# Patient Record
Sex: FEMALE | Race: WHITE
[De-identification: names, ages, dates, MRNs, and addresses within clinical notes are randomized per-mention and may not be internally consistent; named-entity substitution may affect disease eponyms.]

---

## 2017-09-12 ENCOUNTER — HOSPITAL ENCOUNTER (EMERGENCY)
Dept: HOSPITAL 47 - EC | Age: 72
Discharge: HOME | End: 2017-09-12
Payer: MEDICARE

## 2017-09-12 VITALS
RESPIRATION RATE: 18 BRPM | HEART RATE: 87 BPM | SYSTOLIC BLOOD PRESSURE: 175 MMHG | DIASTOLIC BLOOD PRESSURE: 58 MMHG | TEMPERATURE: 98.8 F

## 2017-09-12 DIAGNOSIS — R00.1: ICD-10-CM

## 2017-09-12 DIAGNOSIS — Z88.1: ICD-10-CM

## 2017-09-12 DIAGNOSIS — N39.0: Primary | ICD-10-CM

## 2017-09-12 DIAGNOSIS — Z79.82: ICD-10-CM

## 2017-09-12 DIAGNOSIS — R42: ICD-10-CM

## 2017-09-12 DIAGNOSIS — Z79.899: ICD-10-CM

## 2017-09-12 LAB
ALP SERPL-CCNC: 61 U/L (ref 38–126)
ALT SERPL-CCNC: 37 U/L (ref 9–52)
ANION GAP SERPL CALC-SCNC: 12 MMOL/L
APTT BLD: 26.9 SEC (ref 22–30)
AST SERPL-CCNC: 25 U/L (ref 14–36)
BASOPHILS # BLD AUTO: 0.1 K/UL (ref 0–0.2)
BASOPHILS NFR BLD AUTO: 1 %
BUN SERPL-SCNC: 19 MG/DL (ref 7–17)
CALCIUM SPEC-MCNC: 10 MG/DL (ref 8.4–10.2)
CH: 30.6
CHCM: 34.3
CHLORIDE SERPL-SCNC: 104 MMOL/L (ref 98–107)
CK SERPL-CCNC: 69 U/L (ref 30–135)
CO2 SERPL-SCNC: 24 MMOL/L (ref 22–30)
EOSINOPHIL # BLD AUTO: 0.3 K/UL (ref 0–0.7)
EOSINOPHIL NFR BLD AUTO: 3 %
ERYTHROCYTE [DISTWIDTH] IN BLOOD BY AUTOMATED COUNT: 4.7 M/UL (ref 3.8–5.4)
ERYTHROCYTE [DISTWIDTH] IN BLOOD: 13.6 % (ref 11.5–15.5)
GLUCOSE SERPL-MCNC: 76 MG/DL (ref 74–99)
HCT VFR BLD AUTO: 42.1 % (ref 34–46)
HDW: 2.48
HGB BLD-MCNC: 14.4 GM/DL (ref 11.4–16)
INR PPP: 1 (ref ?–1.2)
LUC NFR BLD AUTO: 2 %
LYMPHOCYTES # SPEC AUTO: 2.4 K/UL (ref 1–4.8)
LYMPHOCYTES NFR SPEC AUTO: 28 %
MCH RBC QN AUTO: 30.7 PG (ref 25–35)
MCHC RBC AUTO-ENTMCNC: 34.3 G/DL (ref 31–37)
MCV RBC AUTO: 89.6 FL (ref 80–100)
MONOCYTES # BLD AUTO: 0.6 K/UL (ref 0–1)
MONOCYTES NFR BLD AUTO: 7 %
NEUTROPHILS # BLD AUTO: 5 K/UL (ref 1.3–7.7)
NEUTROPHILS NFR BLD AUTO: 59 %
NON-AFRICAN AMERICAN GFR(MDRD): 45
PARTICLE COUNT: (no result)
PH UR: 6 [PH] (ref 5–8)
POTASSIUM SERPL-SCNC: 4.5 MMOL/L (ref 3.5–5.1)
PROT SERPL-MCNC: 8.2 G/DL (ref 6.3–8.2)
PT BLD: 10.4 SEC (ref 9–12)
RBC UR QL: 2 /HPF (ref 0–5)
SODIUM SERPL-SCNC: 140 MMOL/L (ref 137–145)
SP GR UR: 1 (ref 1–1.03)
SQUAMOUS UR QL AUTO: 2 /HPF (ref 0–4)
TROPONIN I SERPL-MCNC: <0.012 NG/ML (ref 0–0.03)
UA BILLING (MACRO VS. MICRO): (no result)
UROBILINOGEN UR QL STRIP: <2 MG/DL (ref ?–2)
WBC # BLD AUTO: 0.17 10*3/UL
WBC # BLD AUTO: 8.5 K/UL (ref 3.8–10.6)
WBC #/AREA URNS HPF: 10 /HPF (ref 0–5)
WBC (PEROX): 8.52

## 2017-09-12 PROCEDURE — 71020: CPT

## 2017-09-12 PROCEDURE — 93005 ELECTROCARDIOGRAM TRACING: CPT

## 2017-09-12 PROCEDURE — 84484 ASSAY OF TROPONIN QUANT: CPT

## 2017-09-12 PROCEDURE — 85025 COMPLETE CBC W/AUTO DIFF WBC: CPT

## 2017-09-12 PROCEDURE — 85730 THROMBOPLASTIN TIME PARTIAL: CPT

## 2017-09-12 PROCEDURE — 82553 CREATINE MB FRACTION: CPT

## 2017-09-12 PROCEDURE — 80053 COMPREHEN METABOLIC PANEL: CPT

## 2017-09-12 PROCEDURE — 81001 URINALYSIS AUTO W/SCOPE: CPT

## 2017-09-12 PROCEDURE — 82550 ASSAY OF CK (CPK): CPT

## 2017-09-12 PROCEDURE — 85610 PROTHROMBIN TIME: CPT

## 2017-09-12 PROCEDURE — 96360 HYDRATION IV INFUSION INIT: CPT

## 2017-09-12 PROCEDURE — 99284 EMERGENCY DEPT VISIT MOD MDM: CPT

## 2017-09-12 PROCEDURE — 36415 COLL VENOUS BLD VENIPUNCTURE: CPT

## 2017-09-12 NOTE — XR
EXAMINATION TYPE: XR chest 2V

 

DATE OF EXAM: 9/12/2017

 

COMPARISON: NONE

 

TECHNIQUE: PA and lateral views submitted.

 

HISTORY: Lightheaded

 

FINDINGS:

The lungs are clear and  there is no pneumothorax, pleural effusion, or focal pneumonia.  Hypertrophi
c and degenerative change of the spine. Mild hyperinflation correlate for COPD. Arthropathy of the 
oulders.

 

IMPRESSION: 

1. No acute process.

## 2017-09-12 NOTE — ED
General Adult HPI





- General


Stated complaint: light headed/pvc


Time Seen by Provider: 09/12/17 15:40


Source: RN notes reviewed





- History of Present Illness


Initial comments: 





Patient 72-year-old female who presents emergency room today with chief 

complaint of lightheaded and dizziness on and off over the last 2 months.  

Patient does admit that she was started on blood pressure medication from the 

family doctor in this time.  Patient states does not seem to have made any 

improvement.  She states she was on blood pressure medication once in the past 

but after losing which was able to to stop this medication.  Patient describes 

it as lightheadedness.  She does admit that it's worse with certain movements 

of her head at times.  She states it lasts for a few minutes.  She gives 

examples that when she goes from sitting to a standing position when she has 

these feelings.  She states that he here in the emergency room at this time no 

lightheadedness or dizziness.  She denies any other complaints or associated 

symptoms. Patient denies any recent fever, chills, shortness of breath, chest 

pain, back pain, abdominal pain, nausea or vomiting, numbness or tingling, 

dysuria or hematuria, constipation or diarrhea, headaches or visual changes, or 

any other complaints.





- Related Data


 Home Medications











 Medication  Instructions  Recorded  Confirmed


 


Aspirin EC [Ecotrin Low Dose] 81 mg PO DAILY 09/12/17 09/12/17


 


Cranberry Fruit Extract [Cranberry] 1,000 mg PO DAILY 09/12/17 09/12/17


 


Lisinopril-Hctz 20-12.5 mg 0.5 tab PO DAILY 09/12/17 09/12/17





[Zestoretic 20-12.5]   


 


Multivitamins, Thera [Multivitamin 1 tab PO DAILY 09/12/17 09/12/17





(formulary)]   








 Previous Rx's











 Medication  Instructions  Recorded


 


Sulfamethox-Tmp 800-160Mg [Bactrim 1 tab PO Q12HR #14 tab 09/12/17





-160 mg]  











 Allergies











Allergy/AdvReac Type Severity Reaction Status Date / Time


 


nitrofurantoin AdvReac  FLU LIKE Verified 09/12/17 15:39





[From Macrobid]   SYMPTOMS  














Review of Systems


ROS Statement: 


Those systems with pertinent positive or pertinent negative responses have been 

documented in the HPI.





ROS Other: All systems not noted in ROS Statement are negative.





General Exam





- General Exam Comments


Initial Comments: 





General:  The patient is awake and alert, in no distress, and does not appear 

acutely ill. 


Eye:  Pupils are equal, round and reactive to light, extra-ocular movements are 

intact.  No nystagmus.  There is normal conjunctiva bilaterally.  No signs of 

icterus.  


Ears, nose, mouth and throat:  There are moist mucous membranes and no oral 

lesions. 


Neck:  The neck is supple, there is no tenderness or JVD.  


Cardiovascular:  There is a regular rate and rhythm. No murmur, rub or gallop 

is appreciated.


Respiratory:  Lungs are clear to auscultation, respirations are non-labored, 

breath sounds are equal.  No wheezes, stridor, rales, or rhonchi.


Musculoskeletal:  Normal ROM, no tenderness.  Strength 5/5. Sensation intact. 

Pulses equal bilaterally 2+.  


Neurological:  A&O x 3. CN II-XII intact, There are no obvious motor or sensory 

deficits. Coordination appears grossly intact. Speech is normal.


Skin:  Skin is warm and dry and no rashes or lesions are noted. 


Psychiatric:  Cooperative, appropriate mood & affect, normal judgment.  





EKG Findings





- EKG Comments:


EKG Findings:: EKG shows bradycardia at 48 beats per minute.  ND interval 172.  

.  QT/QTc 444/396.  No ST change.





Medical Decision Making





- Medical Decision Making





Patient reexamined at this time shows no signs of distress.  Patient's resting 

comfortably currently sitting at bedside.  She does admit to feeling better 

after a liter bolus here the emergency room.  Patient's labs been reviewed 

shows evidence for urinary tract infection.  Patient EKG shows sinus 

bradycardia.  Was discussed about being admitted due to having symptoms.  At 

this time she states she is asymptomatic does not want be admitted to the 

hospital would rather go home and follow-up outpatient.  She will be given 

information for cardiology follow-up.  She is advised that she should return to 

emergency room if symptoms return or for any other concerns.  This was 

discussed with attending physician 





- Lab Data


Result diagrams: 


 09/12/17 15:33





 09/12/17 15:33


 Lab Results











  09/12/17 09/12/17 09/12/17 Range/Units





  15:33 15:33 15:33 


 


WBC   8.5   (3.8-10.6)  k/uL


 


RBC   4.70   (3.80-5.40)  m/uL


 


Hgb   14.4   (11.4-16.0)  gm/dL


 


Hct   42.1   (34.0-46.0)  %


 


MCV   89.6   (80.0-100.0)  fL


 


MCH   30.7   (25.0-35.0)  pg


 


MCHC   34.3   (31.0-37.0)  g/dL


 


RDW   13.6   (11.5-15.5)  %


 


Plt Count   226   (150-450)  k/uL


 


Neutrophils %   59   %


 


Lymphocytes %   28   %


 


Monocytes %   7   %


 


Eosinophils %   3   %


 


Basophils %   1   %


 


Neutrophils #   5.0   (1.3-7.7)  k/uL


 


Lymphocytes #   2.4   (1.0-4.8)  k/uL


 


Monocytes #   0.6   (0-1.0)  k/uL


 


Eosinophils #   0.3   (0-0.7)  k/uL


 


Basophils #   0.1   (0-0.2)  k/uL


 


PT     (9.0-12.0)  sec


 


INR     (<1.2)  


 


APTT     (22.0-30.0)  sec


 


Sodium    140  (137-145)  mmol/L


 


Potassium    4.5  (3.5-5.1)  mmol/L


 


Chloride    104  ()  mmol/L


 


Carbon Dioxide    24  (22-30)  mmol/L


 


Anion Gap    12  mmol/L


 


BUN    19 H  (7-17)  mg/dL


 


Creatinine    1.18 H  (0.52-1.04)  mg/dL


 


Est GFR (MDRD) Af Amer    55  (>60 ml/min/1.73 sqM)  


 


Est GFR (MDRD) Non-Af    45  (>60 ml/min/1.73 sqM)  


 


Glucose    76  (74-99)  mg/dL


 


Calcium    10.0  (8.4-10.2)  mg/dL


 


Total Bilirubin    0.7  (0.2-1.3)  mg/dL


 


AST    25  (14-36)  U/L


 


ALT    37  (9-52)  U/L


 


Alkaline Phosphatase    61  ()  U/L


 


Total Creatine Kinase  69    ()  U/L


 


CK-MB (CK-2)  0.8    (0.0-2.4)  ng/mL


 


CK-MB (CK-2) Rel Index  1.2    


 


Troponin I  <0.012    (0.000-0.034)  ng/mL


 


Total Protein    8.2  (6.3-8.2)  g/dL


 


Albumin    4.9  (3.5-5.0)  g/dL


 


Urine Color     


 


Urine Appearance     (Clear)  


 


Urine pH     (5.0-8.0)  


 


Ur Specific Gravity     (1.001-1.035)  


 


Urine Protein     (Negative)  


 


Urine Glucose (UA)     (Negative)  


 


Urine Ketones     (Negative)  


 


Urine Blood     (Negative)  


 


Urine Nitrite     (Negative)  


 


Urine Bilirubin     (Negative)  


 


Urine Urobilinogen     (<2.0)  mg/dL


 


Ur Leukocyte Esterase     (Negative)  


 


Urine RBC     (0-5)  /hpf


 


Urine WBC     (0-5)  /hpf


 


Ur Squamous Epith Cells     (0-4)  /hpf


 


Urine Bacteria     (None)  /hpf














  09/12/17 09/12/17 Range/Units





  15:33 17:01 


 


WBC    (3.8-10.6)  k/uL


 


RBC    (3.80-5.40)  m/uL


 


Hgb    (11.4-16.0)  gm/dL


 


Hct    (34.0-46.0)  %


 


MCV    (80.0-100.0)  fL


 


MCH    (25.0-35.0)  pg


 


MCHC    (31.0-37.0)  g/dL


 


RDW    (11.5-15.5)  %


 


Plt Count    (150-450)  k/uL


 


Neutrophils %    %


 


Lymphocytes %    %


 


Monocytes %    %


 


Eosinophils %    %


 


Basophils %    %


 


Neutrophils #    (1.3-7.7)  k/uL


 


Lymphocytes #    (1.0-4.8)  k/uL


 


Monocytes #    (0-1.0)  k/uL


 


Eosinophils #    (0-0.7)  k/uL


 


Basophils #    (0-0.2)  k/uL


 


PT  10.4   (9.0-12.0)  sec


 


INR  1.0   (<1.2)  


 


APTT  26.9   (22.0-30.0)  sec


 


Sodium    (137-145)  mmol/L


 


Potassium    (3.5-5.1)  mmol/L


 


Chloride    ()  mmol/L


 


Carbon Dioxide    (22-30)  mmol/L


 


Anion Gap    mmol/L


 


BUN    (7-17)  mg/dL


 


Creatinine    (0.52-1.04)  mg/dL


 


Est GFR (MDRD) Af Amer    (>60 ml/min/1.73 sqM)  


 


Est GFR (MDRD) Non-Af    (>60 ml/min/1.73 sqM)  


 


Glucose    (74-99)  mg/dL


 


Calcium    (8.4-10.2)  mg/dL


 


Total Bilirubin    (0.2-1.3)  mg/dL


 


AST    (14-36)  U/L


 


ALT    (9-52)  U/L


 


Alkaline Phosphatase    ()  U/L


 


Total Creatine Kinase    ()  U/L


 


CK-MB (CK-2)    (0.0-2.4)  ng/mL


 


CK-MB (CK-2) Rel Index    


 


Troponin I    (0.000-0.034)  ng/mL


 


Total Protein    (6.3-8.2)  g/dL


 


Albumin    (3.5-5.0)  g/dL


 


Urine Color   Light Yellow  


 


Urine Appearance   Clear  (Clear)  


 


Urine pH   6.0  (5.0-8.0)  


 


Ur Specific Gravity   1.005  (1.001-1.035)  


 


Urine Protein   Negative  (Negative)  


 


Urine Glucose (UA)   Negative  (Negative)  


 


Urine Ketones   Negative  (Negative)  


 


Urine Blood   Small H  (Negative)  


 


Urine Nitrite   Positive H  (Negative)  


 


Urine Bilirubin   Negative  (Negative)  


 


Urine Urobilinogen   <2.0  (<2.0)  mg/dL


 


Ur Leukocyte Esterase   Moderate H  (Negative)  


 


Urine RBC   2  (0-5)  /hpf


 


Urine WBC   10 H  (0-5)  /hpf


 


Ur Squamous Epith Cells   2  (0-4)  /hpf


 


Urine Bacteria   Moderate H  (None)  /hpf














Disposition


Clinical Impression: 


 Bradycardia, UTI (urinary tract infection), Lightheaded





Disposition: HOME SELF-CARE


Condition: Stable


Instructions:  Bradycardia (ED)


Additional Instructions: 


Please use medication as discussed.  Please follow-up with family doctor in the 

next 2 days of symptoms have not improved.  Please return to emergency room if 

the symptoms increase or worsen or for any other concerns.


Prescriptions: 


Sulfamethox-Tmp 800-160Mg [Bactrim -160 mg] 1 tab PO Q12HR #14 tab


Referrals: 


Jaime Gandhi MD [Primary Care Provider] - 1-2 days


Ephraim Ramesh MD [STAFF PHYSICIAN] - 1-2 days


Time of Disposition: 17:58

## 2017-09-17 ENCOUNTER — HOSPITAL ENCOUNTER (INPATIENT)
Dept: HOSPITAL 47 - EC | Age: 72
LOS: 3 days | Discharge: HOME | DRG: 243 | End: 2017-09-20
Payer: MEDICARE

## 2017-09-17 VITALS — BODY MASS INDEX: 33.5 KG/M2

## 2017-09-17 DIAGNOSIS — I45.10: ICD-10-CM

## 2017-09-17 DIAGNOSIS — T40.2X5A: ICD-10-CM

## 2017-09-17 DIAGNOSIS — Z79.82: ICD-10-CM

## 2017-09-17 DIAGNOSIS — N17.9: ICD-10-CM

## 2017-09-17 DIAGNOSIS — Z82.49: ICD-10-CM

## 2017-09-17 DIAGNOSIS — Z98.49: ICD-10-CM

## 2017-09-17 DIAGNOSIS — Z79.899: ICD-10-CM

## 2017-09-17 DIAGNOSIS — Z87.440: ICD-10-CM

## 2017-09-17 DIAGNOSIS — R53.83: ICD-10-CM

## 2017-09-17 DIAGNOSIS — T46.4X5A: ICD-10-CM

## 2017-09-17 DIAGNOSIS — R06.02: ICD-10-CM

## 2017-09-17 DIAGNOSIS — I44.2: Primary | ICD-10-CM

## 2017-09-17 DIAGNOSIS — I10: ICD-10-CM

## 2017-09-17 DIAGNOSIS — Z80.1: ICD-10-CM

## 2017-09-17 DIAGNOSIS — T37.0X5A: ICD-10-CM

## 2017-09-17 DIAGNOSIS — E87.5: ICD-10-CM

## 2017-09-17 DIAGNOSIS — Z80.3: ICD-10-CM

## 2017-09-17 LAB
ALP SERPL-CCNC: 66 U/L (ref 38–126)
ALT SERPL-CCNC: 36 U/L (ref 9–52)
ANION GAP SERPL CALC-SCNC: 11 MMOL/L
AST SERPL-CCNC: 28 U/L (ref 14–36)
BASOPHILS # BLD AUTO: 0.1 K/UL (ref 0–0.2)
BASOPHILS NFR BLD AUTO: 1 %
BUN SERPL-SCNC: 23 MG/DL (ref 7–17)
CALCIUM SPEC-MCNC: 9.9 MG/DL (ref 8.4–10.2)
CH: 31.5
CHCM: 34.8
CHLORIDE SERPL-SCNC: 104 MMOL/L (ref 98–107)
CO2 SERPL-SCNC: 22 MMOL/L (ref 22–30)
EOSINOPHIL # BLD AUTO: 0.2 K/UL (ref 0–0.7)
EOSINOPHIL NFR BLD AUTO: 3 %
ERYTHROCYTE [DISTWIDTH] IN BLOOD BY AUTOMATED COUNT: 4.57 M/UL (ref 3.8–5.4)
ERYTHROCYTE [DISTWIDTH] IN BLOOD: 15.1 % (ref 11.5–15.5)
GLUCOSE SERPL-MCNC: 83 MG/DL (ref 74–99)
HCT VFR BLD AUTO: 41.6 % (ref 34–46)
HDW: 2.37
HGB BLD-MCNC: 14.1 GM/DL (ref 11.4–16)
LUC NFR BLD AUTO: 3 %
LYMPHOCYTES # SPEC AUTO: 2.3 K/UL (ref 1–4.8)
LYMPHOCYTES NFR SPEC AUTO: 33 %
MAGNESIUM SPEC-SCNC: 2.2 MG/DL (ref 1.6–2.3)
MCH RBC QN AUTO: 30.9 PG (ref 25–35)
MCHC RBC AUTO-ENTMCNC: 33.9 G/DL (ref 31–37)
MCV RBC AUTO: 91.1 FL (ref 80–100)
MONOCYTES # BLD AUTO: 0.6 K/UL (ref 0–1)
MONOCYTES NFR BLD AUTO: 8 %
NEUTROPHILS # BLD AUTO: 3.5 K/UL (ref 1.3–7.7)
NEUTROPHILS NFR BLD AUTO: 52 %
NON-AFRICAN AMERICAN GFR(MDRD): 40
POTASSIUM SERPL-SCNC: 5.3 MMOL/L (ref 3.5–5.1)
PROT SERPL-MCNC: 7.6 G/DL (ref 6.3–8.2)
SODIUM SERPL-SCNC: 137 MMOL/L (ref 137–145)
WBC # BLD AUTO: 0.19 10*3/UL
WBC # BLD AUTO: 6.8 K/UL (ref 3.8–10.6)
WBC (PEROX): 6.73

## 2017-09-17 PROCEDURE — 80053 COMPREHEN METABOLIC PANEL: CPT

## 2017-09-17 PROCEDURE — 36415 COLL VENOUS BLD VENIPUNCTURE: CPT

## 2017-09-17 PROCEDURE — 93306 TTE W/DOPPLER COMPLETE: CPT

## 2017-09-17 PROCEDURE — 84484 ASSAY OF TROPONIN QUANT: CPT

## 2017-09-17 PROCEDURE — 83735 ASSAY OF MAGNESIUM: CPT

## 2017-09-17 PROCEDURE — 99285 EMERGENCY DEPT VISIT HI MDM: CPT

## 2017-09-17 PROCEDURE — 85025 COMPLETE CBC W/AUTO DIFF WBC: CPT

## 2017-09-17 PROCEDURE — 93005 ELECTROCARDIOGRAM TRACING: CPT

## 2017-09-17 PROCEDURE — 80048 BASIC METABOLIC PNL TOTAL CA: CPT

## 2017-09-17 PROCEDURE — 33210 INSERT ELECTRD/PM CATH SNGL: CPT

## 2017-09-17 PROCEDURE — 71020: CPT

## 2017-09-17 PROCEDURE — 82553 CREATINE MB FRACTION: CPT

## 2017-09-17 PROCEDURE — 84443 ASSAY THYROID STIM HORMONE: CPT

## 2017-09-17 PROCEDURE — 33208 INSRT HEART PM ATRIAL & VENT: CPT

## 2017-09-17 RX ADMIN — LISINOPRIL SCH MG: 10 TABLET ORAL at 20:38

## 2017-09-17 RX ADMIN — CEFAZOLIN SCH MLS/HR: 330 INJECTION, POWDER, FOR SOLUTION INTRAMUSCULAR; INTRAVENOUS at 20:39

## 2017-09-17 RX ADMIN — CEFAZOLIN SCH MLS/HR: 330 INJECTION, POWDER, FOR SOLUTION INTRAMUSCULAR; INTRAVENOUS at 17:22

## 2017-09-17 NOTE — ED
Arrhythmia/Palpitations HPI





- General


Chief Complaint: Arrhythmia/Palpitations


Stated Complaint: heart palpitations/SOB


Time Seen by Provider: 09/17/17 15:38


Source: patient


Mode of arrival: ambulatory


Limitations: no limitations





- History of Present Illness


Initial Comments: 





This is a 72-year-old female with a history of hypertension who presents 

emergency department for palpitations.  She states that the symptoms seemed to 

start about 5 or 6 days ago.  She states that at that time she was started on 

Bactrim for a UTI.  She did come to the emergency department for 

lightheadedness and dizziness with palpitations at that time.  She was told 

that she had bradycardia and was sent home.  She states that she has been 

having persistent symptoms since then.  She has kept track of her heart rate 

since then and it is been ranging from the 30s to 40s.  At times it will go 

into the 70s or 80s.  She denies any chest pain or shortness of breath.  No 

lightheadedness currently.  She states that she feels that the symptoms are 

related to her lisinopril or Bactrim.





- Related Data


 Home Medications











 Medication  Instructions  Recorded  Confirmed


 


Aspirin EC [Ecotrin Low Dose] 81 mg PO DAILY 09/12/17 09/17/17


 


Cranberry Fruit Extract [Cranberry] 1,000 mg PO DAILY 09/12/17 09/17/17


 


Lisinopril-Hctz 20-12.5 mg 0.5 tab PO DAILY 09/12/17 09/17/17





[Zestoretic 20-12.5]   


 


Multivitamins, Thera [Multivitamin 1 tab PO DAILY 09/12/17 09/17/17





(formulary)]   








 Previous Rx's











 Medication  Instructions  Recorded


 


Sulfamethox-Tmp 800-160Mg [Bactrim 1 tab PO Q12HR #14 tab 09/12/17





-160 mg]  











 Allergies











Allergy/AdvReac Type Severity Reaction Status Date / Time


 


nitrofurantoin AdvReac  FLU LIKE Verified 09/17/17 15:38





[From Macrobid]   SYMPTOMS  














Review of Systems


ROS Statement: 


Those systems with pertinent positive or pertinent negative responses have been 

documented in the HPI.





ROS Other: All systems not noted in ROS Statement are negative.





Past Medical History


Past Medical History: Hypertension


History of Any Multi-Drug Resistant Organisms: None Reported


Additional Past Surgical History / Comment(s): D&C


Past Psychological History: No Psychological Hx Reported


Smoking Status: Never smoker


Past Alcohol Use History: None Reported


Past Drug Use History: None Reported





General Exam





- General Exam Comments


Initial Comments: 





Constitutional: Awake alert Appears comfortable


Head: Normocephalic atraumatic 


Eyes: no conjunctival injection No scleral icterus EOMI


Neck: No JVD Supple


Heart: Bradycardia with regular rhythm normal S1-S2 no murmurs


Lungs: Clear to auscultation bilaterally No wheezing No rales


Abdomen: Soft nondistended nontender


Extremities: Non edematous DP pulses intact Radial pulses intact


Neuro: A&Ox3 No focal neurologic deficits


Psych: Appropriate mood and affect





Limitations: no limitations





Course


 Vital Signs











  09/17/17 09/17/17 09/17/17





  14:47 15:50 17:00


 


Temperature 98.3 F  


 


Pulse Rate 43 L 52 L 41 L


 


Respiratory 20 20 18





Rate   


 


Blood Pressure 204/89 179/79 165/74


 


O2 Sat by Pulse 98 95 96





Oximetry   














EKG Findings





- EKG Comments:


EKG Findings:: EKG showing sinus rhythm with what appears to be a type II AV 

block Mobitz 2.  The rate is 44.  She has a right bundle-branch block.  QTC is 

384.  Other intervals are also remarkable for .  No ectopy.





Medical Decision Making





- Medical Decision Making





This is a 72-year-old female presents emergency department for palpitations.  

She was found to be any Mobitz 2 AV block.  She had mild acute kidney injury 

and hyperkalemia likely secondary to the combination of her blood pressure 

medication and Bactrim.  I'm going to fluid hydrate her.  She needs to be 

monitored overnight and seen by cardiology for possible pacemaker placement.  

The patient was updated and agree.  Dr. Hale except see admission.  All 

questions were answered.





- Lab Data


Result diagrams: 


 09/17/17 15:21





 09/17/17 15:21


 Lab Results











  09/17/17 09/17/17 09/17/17 Range/Units





  15:21 15:21 15:21 


 


WBC   6.8   (3.8-10.6)  k/uL


 


RBC   4.57   (3.80-5.40)  m/uL


 


Hgb   14.1   (11.4-16.0)  gm/dL


 


Hct   41.6   (34.0-46.0)  %


 


MCV   91.1   (80.0-100.0)  fL


 


MCH   30.9   (25.0-35.0)  pg


 


MCHC   33.9   (31.0-37.0)  g/dL


 


RDW   15.1   (11.5-15.5)  %


 


Plt Count   175   (150-450)  k/uL


 


Neutrophils %   52   %


 


Lymphocytes %   33   %


 


Monocytes %   8   %


 


Eosinophils %   3   %


 


Basophils %   1   %


 


Neutrophils #   3.5   (1.3-7.7)  k/uL


 


Lymphocytes #   2.3   (1.0-4.8)  k/uL


 


Monocytes #   0.6   (0-1.0)  k/uL


 


Eosinophils #   0.2   (0-0.7)  k/uL


 


Basophils #   0.1   (0-0.2)  k/uL


 


Sodium    137  (137-145)  mmol/L


 


Potassium    5.3 H  (3.5-5.1)  mmol/L


 


Chloride    104  ()  mmol/L


 


Carbon Dioxide    22  (22-30)  mmol/L


 


Anion Gap    11  mmol/L


 


BUN    23 H  (7-17)  mg/dL


 


Creatinine    1.30 H  (0.52-1.04)  mg/dL


 


Est GFR (MDRD) Af Amer    49  (>60 ml/min/1.73 sqM)  


 


Est GFR (MDRD) Non-Af    40  (>60 ml/min/1.73 sqM)  


 


Glucose    83  (74-99)  mg/dL


 


Calcium    9.9  (8.4-10.2)  mg/dL


 


Magnesium    2.2  (1.6-2.3)  mg/dL


 


Total Bilirubin    0.7  (0.2-1.3)  mg/dL


 


AST    28  (14-36)  U/L


 


ALT    36  (9-52)  U/L


 


Alkaline Phosphatase    66  ()  U/L


 


CK-MB (CK-2)  1.3    (0.0-2.4)  ng/mL


 


Troponin I     (0.000-0.034)  ng/mL


 


Total Protein    7.6  (6.3-8.2)  g/dL


 


Albumin    4.5  (3.5-5.0)  g/dL














  09/17/17 Range/Units





  15:21 


 


WBC   (3.8-10.6)  k/uL


 


RBC   (3.80-5.40)  m/uL


 


Hgb   (11.4-16.0)  gm/dL


 


Hct   (34.0-46.0)  %


 


MCV   (80.0-100.0)  fL


 


MCH   (25.0-35.0)  pg


 


MCHC   (31.0-37.0)  g/dL


 


RDW   (11.5-15.5)  %


 


Plt Count   (150-450)  k/uL


 


Neutrophils %   %


 


Lymphocytes %   %


 


Monocytes %   %


 


Eosinophils %   %


 


Basophils %   %


 


Neutrophils #   (1.3-7.7)  k/uL


 


Lymphocytes #   (1.0-4.8)  k/uL


 


Monocytes #   (0-1.0)  k/uL


 


Eosinophils #   (0-0.7)  k/uL


 


Basophils #   (0-0.2)  k/uL


 


Sodium   (137-145)  mmol/L


 


Potassium   (3.5-5.1)  mmol/L


 


Chloride   ()  mmol/L


 


Carbon Dioxide   (22-30)  mmol/L


 


Anion Gap   mmol/L


 


BUN   (7-17)  mg/dL


 


Creatinine   (0.52-1.04)  mg/dL


 


Est GFR (MDRD) Af Amer   (>60 ml/min/1.73 sqM)  


 


Est GFR (MDRD) Non-Af   (>60 ml/min/1.73 sqM)  


 


Glucose   (74-99)  mg/dL


 


Calcium   (8.4-10.2)  mg/dL


 


Magnesium   (1.6-2.3)  mg/dL


 


Total Bilirubin   (0.2-1.3)  mg/dL


 


AST   (14-36)  U/L


 


ALT   (9-52)  U/L


 


Alkaline Phosphatase   ()  U/L


 


CK-MB (CK-2)   (0.0-2.4)  ng/mL


 


Troponin I  <0.012  (0.000-0.034)  ng/mL


 


Total Protein   (6.3-8.2)  g/dL


 


Albumin   (3.5-5.0)  g/dL














Disposition


Clinical Impression: 


 Mobitz type 2 second degree AV block, Atrial flutter





Disposition: ADMITTED AS IP TO THIS HOSP


Condition: Serious


Referrals: 


Jaime Gandhi MD [Primary Care Provider] - 1-2 days

## 2017-09-17 NOTE — XR
EXAMINATION TYPE: XR chest 2V

 

DATE OF EXAM: 9/17/2017

 

COMPARISON: September 12 2017.

 

HISTORY: Shortness of breath

 

TECHNIQUE:  Frontal and lateral views of the chest are obtained.

 

FINDINGS:  There is no focal air space opacity, pleural effusion, or pneumothorax seen.  The cardiac 
silhouette size is within normal limits.   The osseous structures are intact.

 

IMPRESSION:  No acute cardiopulmonary process.

## 2017-09-18 RX ADMIN — THERA TABS SCH: TAB at 14:54

## 2017-09-18 RX ADMIN — CEFAZOLIN SCH: 330 INJECTION, POWDER, FOR SOLUTION INTRAMUSCULAR; INTRAVENOUS at 21:13

## 2017-09-18 RX ADMIN — LISINOPRIL SCH: 10 TABLET ORAL at 08:54

## 2017-09-18 RX ADMIN — ASPIRIN 81 MG CHEWABLE TABLET SCH: 81 TABLET CHEWABLE at 08:53

## 2017-09-18 NOTE — P.HPIM
History of Present Illness


72-year-old pleasant female came in with complaints of dizziness and 

lightheadedness was discharged from the ER and came back again with the same 

symptoms and patient was found to have third-degree heart block.  Patient 

denied any orthopnea PND of shadows of breath.  Patient denied any fever chills

, nausea vomiting.  Patient was on Bactrim for urinary tract infection which 

appears to be mostly symptom any bacteria patient does have acute renal 

dysfunction may have chronic kidney disease acute renal dysfunction is 

secondary to lisinopril, hydrochlorothiazide and Bactrim all of which will be 

discontinued at this time.  TSH was ordered by cardiology.  Patient may undergo 

permanent pacemaker placement today.  Patient is hypokalemic secondary to 

Bactrim and lisinopril which is being discontinued at this time.








Review of Systems


REVIEW OF SYSTEMS: 


CONSTITUTIONAL: No fever, no malaise, no fatigue. 


HEENT: No recent visual problems or hearing problems. Denied any sore throat. 


CARDIOVASCULAR: No chest pain, orthopnea, PND, no palpitations, no syncope. 


PULMONARY: No shortness of breath, no cough, no hemoptysis. 


GASTROINTESTINAL: No diarrhea, no nausea, no vomiting, no abdominal pain. 

Normoactive bowel sounds. 


NEUROLOGICAL: No headaches, no weakness, no numbness. 


HEMATOLOGICAL: Denies any bleeding or petechiae. 


GENITOURINARY: Denies any burning micturition, frequency, or urgency. 


MUSCULOSKELETAL/RHEUMATOLOGICAL: Denies any joint pain, swelling, or any muscle 

pain. 


ENDOCRINE: Denies any polyuria or polydipsia. 





The rest of the 14-point review of systems is negative.








Past Medical History


Past Medical History: Hypertension


History of Any Multi-Drug Resistant Organisms: None Reported


Additional Past Surgical History / Comment(s): D&C, cataract surgery


Past Psychological History: No Psychological Hx Reported


Smoking Status: Never smoker


Past Alcohol Use History: None Reported


Past Drug Use History: None Reported





- Past Family History


  ** Brother(s)


History Unknown: Yes


Additional Family Medical History / Comment(s): 3 stents from heart 

catheterization





  ** Mother


History Unknown: Yes


Family Medical History: Cancer


Additional Family Medical History / Comment(s): Breast Cancer





  ** Father


History Unknown: Yes


Family Medical History: Cancer


Additional Family Medical History / Comment(s): Lung Cancer





Medications and Allergies


 Home Medications











 Medication  Instructions  Recorded  Confirmed  Type


 


Aspirin EC [Ecotrin Low Dose] 81 mg PO DAILY 09/12/17 09/17/17 History


 


Cranberry Fruit Extract [Cranberry] 1,000 mg PO DAILY 09/12/17 09/17/17 History


 


Lisinopril-Hctz 20-12.5 mg 0.5 tab PO DAILY 09/12/17 09/17/17 History





[Zestoretic 20-12.5]    


 


Multivitamins, Thera [Multivitamin 1 tab PO DAILY 09/12/17 09/17/17 History





(formulary)]    


 


Sulfamethox-Tmp 800-160Mg [Bactrim 1 tab PO Q12HR #14 tab 09/12/17 09/17/17 Rx





-160 mg]    











 Allergies











Allergy/AdvReac Type Severity Reaction Status Date / Time


 


nitrofurantoin AdvReac  FLU LIKE Verified 09/17/17 15:38





[From Macrobid]   SYMPTOMS  














Physical Exam


Vitals: 


 Vital Signs











  Temp Pulse Pulse Resp BP BP Pulse Ox


 


 09/18/17 08:00  97.6 F   40 L  14   147/67  98


 


 09/18/17 04:00  97.7 F   36 L  18   113/56  97


 


 09/17/17 23:30  97.7 F   39 L  18   128/56  96


 


 09/17/17 20:00  97.6 F   41 L  18   136/73  95


 


 09/17/17 18:25  97.6 F   42 L  16   151/67  98


 


 09/17/17 17:52  98.7 F  41 L   18  176/44   97


 


 09/17/17 17:00   41 L   18  165/74   96


 


 09/17/17 15:50   52 L   20  179/79   95


 


 09/17/17 14:47  98.3 F  43 L   20  204/89   98








 Intake and Output











 09/17/17 09/18/17 09/18/17





 22:59 06:59 14:59


 


Intake Total  800 


 


Balance  800 


 


Intake:   


 


  Intake, IV Titration  800 





  Amount   


 


    Sodium Chloride 0.9% 1,  800 





    000 ml @ 100 mls/hr IV .   





    Q10H [facility] Rx#:[id number]   


 


Other:   


 


  Voiding Method Toilet Toilet 


 


  # Voids 1 1 


 


  Weight 88.451 kg 88.9 kg 











PHYSICAL EXAMINATION: 





GENERAL: The patient is alert and oriented x3, not in any acute distress. Well 

developed, well nourished. 


HEENT: Pupils are round and equally reacting to light. EOMI. No scleral 

icterus. No conjunctival pallor. Normocephalic, atraumatic. No pharyngeal 

erythema. No thyromegaly. 


CARDIOVASCULAR: S1 and S2 present. No murmurs, rubs, or gallops. 


PULMONARY: Chest is clear to auscultation, no wheezing or crackles. 


ABDOMEN: Soft, nontender, nondistended, normoactive bowel sounds. No palpable 

organomegaly. 


MUSCULOSKELETAL: No joint swelling or deformity.


EXTREMITIES: No cyanosis, clubbing, or pedal edema. 


NEUROLOGICAL: Gross neurological examination did not reveal any focal deficits. 


SKIN: No rashes. 











Results


CBC & Chem 7: 


 09/17/17 15:21





 09/17/17 15:21


Labs: 


 Abnormal Lab Results - Last 24 Hours (Table)











  09/17/17 Range/Units





  15:21 


 


Potassium  5.3 H  (3.5-5.1)  mmol/L


 


BUN  23 H  (7-17)  mg/dL


 


Creatinine  1.30 H  (0.52-1.04)  mg/dL














Thrombosis Risk Factor Assmnt





- Choose All That Apply


Any of the Below Risk Factors Present?: No


Each Factor Represents 1 point: Medical pt on bed rest


Each Risk Factor Represents 2 Points: Age 61-74 years


Other congenital or acquired thrombophilia - If yes, enter type in comment: No


Thrombosis Risk Factor Assessment Total Risk Factor Score: 3


Thrombosis Risk Factor Assessment Level: Moderate Risk





Assessment and Plan


Plan: 


#1 dizziness and lightheadedness: Found to have third-degree heart block 

patient may undergo pacemaker placement today.


#2 hypokalemia secondary to lisinopril and Bactrim which is being discontinued.


#3 hypertension: Patient was recently started on hydrochlorothiazide and 

lisinopril, her blood pressure is on the low normal side may not be 

hypertensive at all will monitor will hold off on hydrochlorothiazide and 

lisinopril.


#4 acute renal failure: Secondary to hydrocodone presently lisinopril along 

with Bactrim all of which will be discontinued.

## 2017-09-18 NOTE — ECHOF
Referral Reason:bradycardia



MEASUREMENTS

--------

HEIGHT: 162.6 cm

WEIGHT: 88.5 kg

BP: 147/67

RVIDd:   3.5 cm     (< 3.3)

IVSd:   1.3 cm     (0.6 - 1.1)

LVIDd:   4.0 cm     (3.9 - 5.3)

LVPWd:   1.3 cm     (0.6 - 1.1)

IVSs:   1.8 cm

LVIDs:   2.6 cm

LVPWs:   1.6 cm

LA Diam:   3.8 cm     (2.7 - 3.8)

LAESV Index (A-L):   30.21 ml/m

Ao Diam:   3.4 cm     (2.0 - 3.7)

AV Cusp:   2.2 cm     (1.5 - 2.6)

MV EXCURSION:   9.718 mm     (> 18.000)

MV EF SLOPE:   34 mm/s     (70 - 150)

EPSS:   0.8 cm

MV E Rip:   0.94 m/s

MV DecT:   289 ms

MV A Rip:   1.26 m/s

MV E/A Ratio:   0.75 

AV maxP.36 mmHg

AV meanP.36 mmHg

AR PHT:   544 ms







FINDINGS

--------

Sinus rhythm.

This was a technically good study.

The left ventricular size is normal.   There is mild 

concentric left ventricular hypertrophy.   Overall left 

ventricular systolic function is normal with, an EF 

between 60 - 65 %.

The right ventricle is mildly enlarged.

LA is midly dilated 29-33ml/m2.

The right atrium is normal in size.

There is mild aortic valve sclerosis.   There is mild 

aortic regurgitation.

Mild mitral annular calcification present.

The tricuspid valve appears structurally normal.

Trace/mild (physiologic)  pulmonic regurgitation.

The aortic root size is normal.

Normal inferior vena cava with normal inspiratory 

collapse consistent with estimated right atrial 

pressure of  5 mmHg.

There is no pericardial effusion.



CONCLUSIONS

--------

1. Sinus rhythm.

2. There is mild aortic regurgitation.

3. Mild mitral annular calcification present.

4. The tricuspid valve appears structurally normal.

5. Trace/mild (physiologic)  pulmonic regurgitation.

6. The aortic root size is normal.

7. Normal inferior vena cava with normal inspiratory 

collapse consistent with estimated right atrial 

pressure of  5 mmHg.

8. There is no pericardial effusion.

9. This was a technically good study.

10. The left ventricular size is normal.

11. There is mild concentric left ventricular hypertrophy.

12. Overall left ventricular systolic function is normal 

with, an EF between 60 - 65 %.

13. The right ventricle is mildly enlarged.

14. LA is midly dilated 29-33ml/m2.

15. The right atrium is normal in size.

16. There is mild aortic valve sclerosis.





SONOGRAPHER: Supriya Garcia RDCS

## 2017-09-18 NOTE — P.CRDCN
History of Present Illness


Consult date: 09/18/17


Chief complaint: Dizziness and lightheadedness


History of present illness: 





This is a pleasant 72-year-old  female patient with a past medical 

history significant for hypertension presented to the hospital complaining of 

dizziness and lightheadedness.





The patient initially presented a few days ago with the same symptoms and she 

was found to be bradycardic but at that point she was sent home to see a 

cardiologist as an outpatient.  She continues to feel dizzy and lightheaded and 

not feeling well and she decided to come to the emergency room again.  She did 

not have any symptoms of chest pain or discomfort but she was experiencing 

exertional dyspnea without orthopnea.





In the ER the patient was found to be in sinus rhythm with complete heart 

block.  The patient has been maintaining heart rate in the 30s.  She is not on 

any AV luca blocker agents.  She is not aware of any prior thyroid problem.





I am going to obtain an echocardiogram was Doppler to assess the LV function.  

Check TSH to rule out any thyroid disorder.  The patient likely would benefit 

from permanent pacemaker implantation.





Past Medical History


Past Medical History: Hypertension


History of Any Multi-Drug Resistant Organisms: None Reported


Additional Past Surgical History / Comment(s): D&C, cataract surgery


Past Psychological History: No Psychological Hx Reported


Smoking Status: Never smoker


Past Alcohol Use History: None Reported


Past Drug Use History: None Reported





- Past Family History


  ** Brother(s)


History Unknown: Yes


Additional Family Medical History / Comment(s): 3 stents from heart 

catheterization





  ** Mother


History Unknown: Yes


Family Medical History: Cancer


Additional Family Medical History / Comment(s): Breast Cancer





  ** Father


History Unknown: Yes


Family Medical History: Cancer


Additional Family Medical History / Comment(s): Lung Cancer





Medications and Allergies


 Home Medications











 Medication  Instructions  Recorded  Confirmed  Type


 


Aspirin EC [Ecotrin Low Dose] 81 mg PO DAILY 09/12/17 09/17/17 History


 


Cranberry Fruit Extract [Cranberry] 1,000 mg PO DAILY 09/12/17 09/17/17 History


 


Lisinopril-Hctz 20-12.5 mg 0.5 tab PO DAILY 09/12/17 09/17/17 History





[Zestoretic 20-12.5]    


 


Multivitamins, Thera [Multivitamin 1 tab PO DAILY 09/12/17 09/17/17 History





(formulary)]    


 


Sulfamethox-Tmp 800-160Mg [Bactrim 1 tab PO Q12HR #14 tab 09/12/17 09/17/17 Rx





-160 mg]    











 Allergies











Allergy/AdvReac Type Severity Reaction Status Date / Time


 


nitrofurantoin AdvReac  FLU LIKE Verified 09/17/17 15:38





[From Macrobid]   SYMPTOMS  














Physical Exam


Vitals: 


 Vital Signs











  Temp Pulse Pulse Resp BP BP Pulse Ox


 


 09/18/17 08:00  97.6 F   40 L  14   147/67  98


 


 09/18/17 04:00  97.7 F   36 L  18   113/56  97


 


 09/17/17 23:30  97.7 F   39 L  18   128/56  96


 


 09/17/17 20:00  97.6 F   41 L  18   136/73  95


 


 09/17/17 18:25  97.6 F   42 L  16   151/67  98


 


 09/17/17 17:52  98.7 F  41 L   18  176/44   97


 


 09/17/17 17:00   41 L   18  165/74   96


 


 09/17/17 15:50   52 L   20  179/79   95


 


 09/17/17 14:47  98.3 F  43 L   20  204/89   98








 Intake and Output











 09/17/17 09/18/17 09/18/17





 22:59 06:59 14:59


 


Intake Total  800 


 


Balance  800 


 


Intake:   


 


  Intake, IV Titration  800 





  Amount   


 


    Sodium Chloride 0.9% 1,  800 





    000 ml @ 100 mls/hr IV .   





    Q10H [facility] Rx#:[id number]   


 


Other:   


 


  Voiding Method Toilet Toilet 


 


  # Voids 1 1 


 


  Weight 88.451 kg 88.9 kg 














- Constitutional


General appearance: no acute distress





- Respiratory


Respiratory: bilateral: CTA





- Cardiovascular


Rhythm: regular


Heart sounds: normal: S1, S2





Results





 09/17/17 15:21





 09/17/17 15:21


 Cardiac Enzymes











  09/17/17 09/17/17 09/17/17 Range/Units





  15:21 15:21 15:21 


 


AST   28   (14-36)  U/L


 


CK-MB (CK-2)  1.3    (0.0-2.4)  ng/mL


 


Troponin I    <0.012  (0.000-0.034)  ng/mL








 CBC











  09/17/17 Range/Units





  15:21 


 


WBC  6.8  (3.8-10.6)  k/uL


 


RBC  4.57  (3.80-5.40)  m/uL


 


Hgb  14.1  (11.4-16.0)  gm/dL


 


Hct  41.6  (34.0-46.0)  %


 


Plt Count  175  (150-450)  k/uL








 Comprehensive Metabolic Panel











  09/17/17 Range/Units





  15:21 


 


Sodium  137  (137-145)  mmol/L


 


Potassium  5.3 H  (3.5-5.1)  mmol/L


 


Chloride  104  ()  mmol/L


 


Carbon Dioxide  22  (22-30)  mmol/L


 


BUN  23 H  (7-17)  mg/dL


 


Creatinine  1.30 H  (0.52-1.04)  mg/dL


 


Glucose  83  (74-99)  mg/dL


 


Calcium  9.9  (8.4-10.2)  mg/dL


 


AST  28  (14-36)  U/L


 


ALT  36  (9-52)  U/L


 


Alkaline Phosphatase  66  ()  U/L


 


Total Protein  7.6  (6.3-8.2)  g/dL


 


Albumin  4.5  (3.5-5.0)  g/dL








 Current Medications











Generic Name Dose Route Start Last Admin





  Trade Name Freq  PRN Reason Stop Dose Admin


 


Alprazolam  0.25 mg  09/17/17 18:54  09/17/17 20:38





  Xanax  PO   0.25 mg





  Q6H PRN   Administration





  Anxiety   


 


Aspirin  81 mg  09/18/17 09:00  09/18/17 08:53





  Aspirin  PO   Not Given





  DAILY VITO   


 


Sodium Chloride  1,000 mls @ 100 mls/hr  09/17/17 16:30  09/17/17 20:39





  Saline 0.9%  IV   100 mls/hr





  .Q10H VITO   Administration


 


Lisinopril  10 mg  09/17/17 19:00  09/18/17 08:54





  Zestril  PO   Not Given





  DAILY VITO   


 


Multivitamins  1 each  09/18/17 12:00  





  Theragran  PO   





  DAILY@1200 VITO   


 


Naloxone HCl  0.2 mg  09/17/17 17:17  





  Narcan  IV   





  Q2M PRN   





  Opioid Reversal   








 Intake and Output











 09/17/17 09/18/17 09/18/17





 22:59 06:59 14:59


 


Intake Total  800 


 


Balance  800 


 


Intake:   


 


  Intake, IV Titration  800 





  Amount   


 


    Sodium Chloride 0.9% 1,  800 





    000 ml @ 100 mls/hr IV .   





    Q10H [facility] Rx#:[id number]   


 


Other:   


 


  Voiding Method Toilet Toilet 


 


  # Voids 1 1 


 


  Weight 88.451 kg 88.9 kg 








 





 09/17/17 15:21 





 09/17/17 15:21 











Assessment and Plan


Plan: 





This is a pleasant 19-year-old  female patient who was admitted to the 

hospital with symptomatic bradycardia.





Currently the patient is in a complete heart block but she has been maintaining 

good blood pressure.





I will obtain a TSH.  Obtain an echocardiogram was Doppler.  The patient will 

benefit from permanent pacemaker implantation.

## 2017-09-19 LAB
ANION GAP SERPL CALC-SCNC: 12 MMOL/L
BUN SERPL-SCNC: 17 MG/DL (ref 7–17)
CALCIUM SPEC-MCNC: 9.3 MG/DL (ref 8.4–10.2)
CHLORIDE SERPL-SCNC: 110 MMOL/L (ref 98–107)
CO2 SERPL-SCNC: 16 MMOL/L (ref 22–30)
GLUCOSE SERPL-MCNC: 77 MG/DL (ref 74–99)
NON-AFRICAN AMERICAN GFR(MDRD): 55
POTASSIUM SERPL-SCNC: 4.8 MMOL/L (ref 3.5–5.1)
SODIUM SERPL-SCNC: 138 MMOL/L (ref 137–145)

## 2017-09-19 PROCEDURE — 0JH606Z INSERTION OF PACEMAKER, DUAL CHAMBER INTO CHEST SUBCUTANEOUS TISSUE AND FASCIA, OPEN APPROACH: ICD-10-PCS

## 2017-09-19 PROCEDURE — 02H63JZ INSERTION OF PACEMAKER LEAD INTO RIGHT ATRIUM, PERCUTANEOUS APPROACH: ICD-10-PCS

## 2017-09-19 PROCEDURE — 02HK3JZ INSERTION OF PACEMAKER LEAD INTO RIGHT VENTRICLE, PERCUTANEOUS APPROACH: ICD-10-PCS

## 2017-09-19 RX ADMIN — CEFAZOLIN SCH: 330 INJECTION, POWDER, FOR SOLUTION INTRAMUSCULAR; INTRAVENOUS at 07:01

## 2017-09-19 RX ADMIN — ASPIRIN 81 MG CHEWABLE TABLET SCH MG: 81 TABLET CHEWABLE at 08:37

## 2017-09-19 RX ADMIN — FENTANYL CITRATE ONE MCG: 50 INJECTION, SOLUTION INTRAMUSCULAR; INTRAVENOUS at 19:13

## 2017-09-19 RX ADMIN — FENTANYL CITRATE ONE MCG: 50 INJECTION, SOLUTION INTRAMUSCULAR; INTRAVENOUS at 18:31

## 2017-09-19 RX ADMIN — CEFAZOLIN SCH MLS/HR: 330 INJECTION, POWDER, FOR SOLUTION INTRAMUSCULAR; INTRAVENOUS at 07:02

## 2017-09-19 RX ADMIN — THERA TABS SCH: TAB at 11:51

## 2017-09-19 RX ADMIN — SODIUM CHLORIDE, PRESERVATIVE FREE SCH ML: 5 INJECTION INTRAVENOUS at 21:29

## 2017-09-19 RX ADMIN — CEFAZOLIN SCH: 330 INJECTION, POWDER, FOR SOLUTION INTRAMUSCULAR; INTRAVENOUS at 21:16

## 2017-09-19 RX ADMIN — LISINOPRIL AND HYDROCHLOROTHIAZIDE SCH EACH: 12.5; 2 TABLET ORAL at 21:29

## 2017-09-19 NOTE — P.PCN
Preoperative Diagnosis: 


Transvenous temporary pacing procedure





Indication for the procedure: Severe underlying bradycardia





Patient was brought to the EP lab in a fasting state.  Written informed consent 

was obtained prior to the procedure.  The right groin was prepped and draped as 

a protocol.  A 6-[place] sheath was placed in the right femoral vein.  Via this, 

a temporary pacing catheter was placed in the right ventricle.  Thresholds were 

interrogated.  Temporary pacing was performed through the rest of the procedure.


At the end of the entire procedure, the TVP was removed.  The sheath was 

removed and hemostasis was assured.





Patient tolerated the procedure well without any acute complications.





Procedure performed


Transvenous temporary pacing


Anesthesia: local


Condition: stable


Disposition: floor

## 2017-09-19 NOTE — P.PN
Subjective


Patient was admitted for third degree heart block and patient will undergo 

pacemaker placement today.  Next





Patient denied any chest pain, lightheadedness, shortness of breath, dysuria.








Objective





- Vital Signs


Vital signs: 


 Vital Signs











Temp  97.2 F L  09/19/17 11:37


 


Pulse  44 L  09/19/17 11:37


 


Resp  16   09/19/17 11:37


 


BP  172/75   09/19/17 11:37


 


Pulse Ox  96   09/19/17 11:37








 Intake & Output











 09/18/17 09/19/17 09/19/17





 18:59 06:59 18:59


 


Intake Total 300 160 118


 


Balance 300 160 118


 


Weight  88.2 kg 


 


Intake:   


 


  Intake, IV Titration 300 160 





  Amount   


 


    Sodium Chloride 0.9% 1, 300  





    000 ml @ 100 mls/hr IV .   





    Q10H [facility] Rx#:[id number]   


 


    Sodium Chloride 0.9% 1,  160 





    000 ml @ 20 mls/hr IV .   





    Q24H [facility] Rx#:[id number]   


 


  Oral   118


 


Other:   


 


  Voiding Method  Toilet 


 


  # Voids 3 1 














- Exam


PHYSICAL EXAMINATION: 





GENERAL: The patient is alert and oriented x3, not in any acute distress. Well 

developed, well nourished. 


HEENT: Pupils are round and equally reacting to light. EOMI. No scleral 

icterus. No conjunctival pallor. Normocephalic, atraumatic. No pharyngeal 

erythema. No thyromegaly. 


CARDIOVASCULAR: S1 and S2 present. No murmurs, rubs, or gallops. 


PULMONARY: Chest is clear to auscultation, no wheezing or crackles. 


ABDOMEN: Soft, nontender, nondistended, normoactive bowel sounds. No palpable 

organomegaly. 


MUSCULOSKELETAL: No joint swelling or deformity.


EXTREMITIES: No cyanosis, clubbing, or pedal edema. 


NEUROLOGICAL: Gross neurological examination did not reveal any focal deficits. 


SKIN: No rashes. 











- Labs


CBC & Chem 7: 


 09/17/17 15:21





 09/19/17 11:45


Labs: 


 Abnormal Lab Results - Last 24 Hours (Table)











  09/19/17 Range/Units





  11:45 


 


Chloride  110 H  ()  mmol/L


 


Carbon Dioxide  16 L  (22-30)  mmol/L














Assessment and Plan


Plan: 


#1 dizziness and lightheadedness: Found to have third-degree heart block 

patient may undergo pacemaker placement today.


#2 hypokalemia secondary to lisinopril and Bactrim which is being discontinued.


#3 hypertension: Patient was recently started on hydrochlorothiazide and 

lisinopril, her blood pressure is on the low normal side may not be 

hypertensive at all will monitor will hold off on hydrochlorothiazide and 

lisinopril.  Patient's blood pressure is bit elevated will just monitor for 

today we'll obtain basic metabolic profile for today.


#4 acute renal failure: Secondary to hydrocodone presently lisinopril along 

with Bactrim all of which will be discontinued.

## 2017-09-19 NOTE — PCN
PROCEDURE NOTE



Maxine Mendoza presented with 2:1 heart block with heart rates in the mid 30s,

symptomatic with dizziness and shortness of breath.  She first underwent a temporary

transvenous permanent pacemaker implantation today. Subsequently a dual-chamber

pacemaker was implanted.



PROCEDURE:

The left pectoral region was prepped and draped as per protocol.  Lidocaine 1% was used

for local anesthesia. A 4 cm incision was made parallel to the deltopectoral groove,

about 1.5 cm medial to it. The incision was carried down to the level of the pectoralis

muscle.  A subfascial pocket was made. Hemostasis was assured. The left axillary vein

was accessed at 2 separate points under fluoroscopy, and via appropriately sized

introducer sheaths, 2 leads were positioned in the right heart.



The atrial lead was [employer], model #1944, 46 cm in length, and serial

#[id number].  P-waves 5.3 mV, pacing impedance 614 ohms, pacing threshold 0.8 V at 0.5

milliseconds.  Ten-volt test was negative.



The ventricular lead was a [employer], model #1948, 58 cm in length and serial

#[id number]. The paced R-wave that was sensed at 11.4 mV, pacing impedance 801 ohms,

pacing threshold 0.3 V at 0.5 milliseconds.  Ten-volt test was negative.



Both leads were secured to the underlying pectoralis fascia using 2 non-absorbable

sutures.  The pocket was irrigated with antibiotic solution.  Leads were connected to

the generator ([employer], model # [id number], serial #[id number]. Leads and the

generator were then placed in the subfascial pocket and the wound was closed in 3

layers and dressed per protocol.



Subsequently the transvenous temporary pacemaker was removed from the right groin/right

femoral vein and venous sheath was removed.  Hemostasis was assured.



PLAN:

Restart antihypertensive therapy. Dual-chamber pacemaker programmed at DDD at 50 ppm

with an AV delay of 200/250 milliseconds. IV antibiotics. Restart antihypertensive

therapy and follow up in the device clinic in a week.





MMODL / IJN: [id number] / Job#: [id number]

## 2017-09-20 VITALS — HEART RATE: 82 BPM | DIASTOLIC BLOOD PRESSURE: 80 MMHG | SYSTOLIC BLOOD PRESSURE: 148 MMHG

## 2017-09-20 VITALS — RESPIRATION RATE: 16 BRPM

## 2017-09-20 VITALS — TEMPERATURE: 97.7 F

## 2017-09-20 LAB
ANION GAP SERPL CALC-SCNC: 11 MMOL/L
BUN SERPL-SCNC: 15 MG/DL (ref 7–17)
CALCIUM SPEC-MCNC: 9.2 MG/DL (ref 8.4–10.2)
CHLORIDE SERPL-SCNC: 108 MMOL/L (ref 98–107)
CO2 SERPL-SCNC: 18 MMOL/L (ref 22–30)
GLUCOSE SERPL-MCNC: 86 MG/DL (ref 74–99)
NON-AFRICAN AMERICAN GFR(MDRD): >60
POTASSIUM SERPL-SCNC: 4.3 MMOL/L (ref 3.5–5.1)
SODIUM SERPL-SCNC: 137 MMOL/L (ref 137–145)

## 2017-09-20 RX ADMIN — HYDROCODONE BITARTRATE AND ACETAMINOPHEN PRN EACH: 5; 325 TABLET ORAL at 16:27

## 2017-09-20 RX ADMIN — SODIUM CHLORIDE, PRESERVATIVE FREE SCH ML: 5 INJECTION INTRAVENOUS at 08:40

## 2017-09-20 RX ADMIN — ASPIRIN 81 MG CHEWABLE TABLET SCH MG: 81 TABLET CHEWABLE at 08:40

## 2017-09-20 RX ADMIN — LISINOPRIL AND HYDROCHLOROTHIAZIDE SCH EACH: 12.5; 2 TABLET ORAL at 08:40

## 2017-09-20 RX ADMIN — HYDROCODONE BITARTRATE AND ACETAMINOPHEN PRN EACH: 5; 325 TABLET ORAL at 11:56

## 2017-09-20 RX ADMIN — HYDROCODONE BITARTRATE AND ACETAMINOPHEN PRN EACH: 5; 325 TABLET ORAL at 05:34

## 2017-09-20 RX ADMIN — HYDROCODONE BITARTRATE AND ACETAMINOPHEN PRN EACH: 5; 325 TABLET ORAL at 00:58

## 2017-09-20 RX ADMIN — THERA TABS SCH EACH: TAB at 11:56

## 2017-09-20 NOTE — LTR
Date:



September 19, 2017





Re:  Maxine Mendoza





Dear Dr. Gandhi:



I had the pleasure of seeing Maxine Mendoza in electrophysiology consultation and

[facility].  Maxine was admitted with 2:1 heart block with heart rates in the

mid 30s.  She is complaining of dizziness and feeling tired and fatigued.  She

underwent a dual chamber pacemaker implantation without any acute complications.  Her

antihypertensive therapy has been restarted.



Thank you for entrusting me in the care of your patient. If you have any questions,

please do not hesitate to give me a call.



Sincerely,







MD TRINITY Swift / BETZAIDA: [id number] / Job#: [id number]

## 2017-09-20 NOTE — P.DS
Providers


Date of admission: 


09/17/17 17:19





Attending physician: 


Anabela Hale





Consults: 





 





09/17/17 17:18


Consult Physician Routine 


   Consulting Provider: Mohan Oro


   Consult Reason/Comments: Mobitz 2 AV block


   Do you want consulting provider notified?: Yes





09/20/17 08:30


Consult Physician Routine 


   Consulting Provider: Adrien Weinberg


   Consult Reason/Comments: heart block (consult requested September18)


   Do you want consulting provider notified?: Already Contacted











Primary care physician: 


[facility] Course: 


Patient was admitted for third degree heart block and patient will undergo 

pacemaker placement today.  


Patient had a Pacemaker and will be discharged today.  Patient's hyperkalemia 

resolved patient will be started on low-dose of lisinopril will be discharged 

on Keflex and Norco for pain.





PHYSICAL EXAMINATION: 





GENERAL: The patient is alert and oriented x3, not in any acute distress. Well 

developed, well nourished. 


HEENT: Pupils are round and equally reacting to light. EOMI. No scleral 

icterus. No conjunctival pallor. Normocephalic, atraumatic. No pharyngeal 

erythema. No thyromegaly. 


CARDIOVASCULAR: S1 and S2 present. No murmurs, rubs, or gallops. 


PULMONARY: Chest is clear to auscultation, no wheezing or crackles. 


ABDOMEN: Soft, nontender, nondistended, normoactive bowel sounds. No palpable 

organomegaly. 


MUSCULOSKELETAL: No joint swelling or deformity.


EXTREMITIES: No cyanosis, clubbing, or pedal edema. 


NEUROLOGICAL: Gross neurological examination did not reveal any focal deficits. 


SKIN: No rashes. 





#1 third-degree heart block: Status post pacemaker placement


#2 hypertension 


 #3 acute renal failure resolved after discontinue additional for offending 

drugs





Patient Condition at Discharge: Serious





Plan - Discharge Summary


New Discharge Prescriptions: 


New


   Cephalexin [Keflex] 500 mg PO Q8HR #15 cap


   HYDROcodone/APAP 7.5-325MG [Norco 7.5-325] 1 tab PO Q4H PRN #20 tab


     PRN Reason: Pain


   Lisinopril [Zestril] 10 mg PO DAILY #30 tab





Discontinued


   Lisinopril-Hctz 20-12.5 mg [Zestoretic 20-12.5] 0.5 tab PO DAILY


   Sulfamethox-Tmp 800-160Mg [Bactrim -160 mg] 1 tab PO Q12HR #14 tab





No Action


   Multivitamins, Thera [Multivitamin (formulary)] 1 tab PO DAILY


   Aspirin EC [Ecotrin Low Dose] 81 mg PO DAILY


   Cranberry Fruit Extract [Cranberry] 1,000 mg PO DAILY


Discharge Medication List





Aspirin EC [Ecotrin Low Dose] 81 mg PO DAILY 09/12/17 [History]


Cranberry Fruit Extract [Cranberry] 1,000 mg PO DAILY 09/12/17 [History]


Multivitamins, Thera [Multivitamin (formulary)] 1 tab PO DAILY 09/12/17 [History

]


Cephalexin [Keflex] 500 mg PO Q8HR #15 cap 09/20/17 [Rx]


HYDROcodone/APAP 7.5-325MG [Norco 7.5-325] 1 tab PO Q4H PRN #20 tab 09/20/17 [Rx

]


Lisinopril [Zestril] 10 mg PO DAILY #30 tab 09/20/17 [Rx]








Follow up Appointment(s)/Referral(s): 


Ephraim Ramesh MD [STAFF PHYSICIAN] - 10/12/17 10:15 am


Teresa Morel PAC [REFERRING] - 09/22/17 1:00 pm (Dwight Bob NP 

appointment. Farnaz out of town this week.)


Patient Instructions/Handouts:  Pacemaker (DC)


Activity/Diet/Wound Care/Special Instructions: 


Device check in one week on Wednesday September 27th at 10:30. Please come to 

cardiology office for check.





Pacemaker insertion precautions:





1. No raising left arm above shoulder height for one month.


2. No strenuous activity, or heavy lifting for one month.


3. No driving, sexual activity, or stair climbing for one week.


4. Keep incision site dry for one week. If showering, cover with saran wrap to 

keep dressing dry. 


5. Keep groin site punctures clean and dry, no bathing or soaking in water. 

Daily showering is recommended, clean sites with soap and water. Blot dry.





Notify your physician immediately if any of the following occur:


1. Bleeding


2. Drainage/Oozing


3. Dizziness and fainting


4. Increasing swelling in pacemaker site or groin sites


5. Redness


6. Chest pain


7. Increased pain


8. Fever and chills


9. Shortness of breath


Discharge Disposition: HOME SELF-CARE

## 2017-09-20 NOTE — P.PN
Subjective


Principal diagnosis: 





Second-degree heart block


This is a pleasant 72-year-old  female with past medical history 

significant for hypertension who initially presented to the hospital with 

symptoms of dizziness and lightheadedness.  In the emergency room the patient 

was found to be in sinus rhythm with complete heart block heart rate in the 

30s.  She underwent implantation of a permanent pacemaker yesterday by Dr. Weinberg.  Device has not yet been interrogated this morning.  Chest x-ray was 

performed which did not reveal any evidence of complication related to 

pacemaker placement.  Blood pressure 126/60 with a heart rate in the 80s.  

Sodium 137, potassium 4.3, BUN 15, creatinine 0.9.  Patient feels well overall, 

up ambulating without any difficulty.








Objective





- Vital Signs


Vital signs: 


 Vital Signs











Temp  97.7 F   09/20/17 08:35


 


Pulse  80   09/20/17 08:35


 


Resp  16   09/20/17 08:35


 


BP  127/63   09/20/17 08:35


 


Pulse Ox  95   09/20/17 08:35








 Intake & Output











 09/19/17 09/20/17 09/20/17





 18:59 06:59 18:59


 


Intake Total 368 100 118


 


Output Total  500 


 


Balance 368 -400 118


 


Weight  87.2 kg 


 


Intake:   


 


   100 


 


    ceFAZolin 2 gm In Sodium  100 





    Chloride 0.9% 100 ml @   





    100 mls/hr IVPB Q6H [facility]   





    Rx#:[id number]   


 


  Oral 118  118


 


Output:   


 


  Urine  500 


 


Other:   


 


  Voiding Method Toilet Bedpan 


 


  # Voids  1 














- Exam





PHYSICAL EXAMINATION: 





HEENT: Head is atraumatic, normocephalic.  Pupils equal, round.  Neck is 

supple.  There is no elevated jugular venous pressure.





HEART EXAMINATION: Heart S1, S2 normal.  No murmur or gallop heard.





CHEST EXAMINATION: Lungs are clear to auscultation and precussion. No chest 

wall tenderness is noted on palpation or with deep breathing.  Site of 

pacemaker implantation, dressing is dry and intact.





ABDOMEN:  Soft, nontender. Bowel sounds are heard. No organomegaly noted.





Right groin soft, no evidence of any hematoma.


 


EXTREMITIES: 2+ peripheral pulses with no evidence of peripheral edema and no 

calf tenderness noted.





NEUROLOGIC patient is awake, alert and oriented -3.


 


.


 








- Labs


CBC & Chem 7: 


 09/17/17 15:21





 09/20/17 06:03


Labs: 


 Abnormal Lab Results - Last 24 Hours (Table)











  09/19/17 09/20/17 Range/Units





  11:45 06:03 


 


Chloride  110 H  108 H  ()  mmol/L


 


Carbon Dioxide  16 L  18 L  (22-30)  mmol/L














Assessment and Plan


(1) HTN (hypertension)


Status: Acute   





(2) Mobitz type 2 second degree AV block


Status: Acute   


Plan: 


From cardiology's perspective, once the patient has received her last 

antibiotic dose she may be able to be discharged home.  We will make her a 

follow-up appointment in the device clinic and with Dr. Barros post discharge.





DNP note has been reviewed, I agree with a documented findings and plan of 

care.  Patient was seen and examined.

## 2017-09-20 NOTE — XR
EXAMINATION TYPE: XR chest 2V

 

DATE OF EXAM: 9/20/2017

 

COMPARISON: Chest x-ray September 12, 2017

 

HISTORY: Lead placement check.

 

TECHNIQUE:  Frontal and lateral views of the chest are obtained.

 

FINDINGS:  The osseous structures remain somewhat demineralized. There is persistent mild cardiomegal
y with atherosclerotic thoracic aorta. There is new dual lead pacemaker with leads projecting over ri
ght atrium and right ventricle the lateral view is slightly suboptimal due to overlying upper extremi
ties. Lungs remain clear without pleural effusion or pneumothorax seen bilaterally.

 

IMPRESSION:  Suboptimal study but leads likely within right atrium and right ventricle, no evidence o
f complication related to pacemaker placement.

## 2021-09-16 NOTE — HP
HISTORY AND PHYSICAL



DATE OF SURGERY:

2021



This is a 76-year-old female who presents with a history of increasing pressure on the

perineal body and bulge.  It is difficult for her to sit down.  She is still sexually

active.  She has used a pessary in the past, and is unhappy with its results and is

requesting surgical repair.  In addition, sonogram has suggested a small fibroma of the

right ovary, and patient is requesting right salpingo-oophorectomy.  She denies vaginal

bleeding.  No issues with bowel movements.



PAST MEDICAL HISTORY:

Past medical history is significant for back pain, depression, hypertension.  Patient

has a pacemaker.  She also has a long-standing history of uterine prolapse.



PAST SURGICAL HISTORY:

Pacemaker placed.



CURRENT MEDICATIONS:

Baby aspirin daily, multivitamin daily, losartan 50 mg/hydrochlorothiazide 12.5 mg once

daily.



ALLERGIES:

ALLERGIES include LIPITOR and NITROFURANTOIN; reactions not documented.



FAMILY HISTORY:

Significant for breast cancer, lung cancer.



REPRODUCTIVE HISTORY:

The patient is a  0.



SOCIAL HISTORY:

She has never been a smoker. She is , retired. Denies alcohol or drug use.



REVIEW OF SYSTEMS:

Otherwise negative.



PHYSICAL EXAMINATION:

Patient is 5 feet 4 inches, 192 pounds, blood pressure 132/78, pulse 97, 99% O2

saturation on room air.

HEENT exam reveals no thyromegaly, no cervical lymphadenopathy, trachea midline.

Breasts are bilaterally atrophic. No skin changes, nipple discharge, axillary

adenopathy or discernible lesions or masses.

ABDOMEN:  Soft and nontender. No organosplenomegaly. No herniorrhaphy. Active bowel

sounds.

CHEST:  Clear to auscultation in all fields anteriorly and posteriorly.

EXTERNAL GENITALIA: Age-appropriate. Slight atrophic changes.  There is a grade 4

uterine prolapse noted without evidence of significant rectocele or cystocele.  Number

5 ring pessary is removed.

Adnexa are negative to palpation, mobile, smooth and nontender.

Rectal exam reveals no rectocele, no external hemorrhoids, good tone, FIT negative

stool.

Psychiatric exam is negative. Alert and oriented x3 with appropriate mood and affect.



IMPRESSION:

Grade 4 uterine prolapse, sonographic evidence of small right ovarian cyst. Patient

requesting vaginal hysterectomy and right salpingo-oophorectomy.



PLAN:

We will proceed with surgery at [facility].  Risks of bleeding, infection,

perforation or damage to bowel, bladder, ureters all discussed.  Postoperative

dyspareunia also discussed.  All questions answered.  Risks of anesthesia were also

reviewed in detail.  I believe the patient has a thorough understanding of the above.

We will proceed with vaginal hysterectomy and right salpingo-oophorectomy at [facility] on 2021.  Second opinion offered and declined.





MMODL / IJN: [id number] / Job#: [id number]

## 2021-09-17 ENCOUNTER — HOSPITAL ENCOUNTER (OUTPATIENT)
Dept: HOSPITAL 47 - LABPAT | Age: 76
Discharge: HOME | End: 2021-09-17
Attending: OBSTETRICS & GYNECOLOGY
Payer: MEDICARE

## 2021-09-17 DIAGNOSIS — R94.31: ICD-10-CM

## 2021-09-17 DIAGNOSIS — N81.4: ICD-10-CM

## 2021-09-17 DIAGNOSIS — I10: ICD-10-CM

## 2021-09-17 DIAGNOSIS — I44.0: ICD-10-CM

## 2021-09-17 DIAGNOSIS — Z01.818: Primary | ICD-10-CM

## 2021-09-17 DIAGNOSIS — I25.2: ICD-10-CM

## 2021-09-17 LAB
ANION GAP SERPL CALC-SCNC: 12 MMOL/L
BASOPHILS # BLD AUTO: 0 K/UL (ref 0–0.2)
BASOPHILS NFR BLD AUTO: 1 %
BUN SERPL-SCNC: 19 MG/DL (ref 7–17)
CHLORIDE SERPL-SCNC: 105 MMOL/L (ref 98–107)
CO2 SERPL-SCNC: 20 MMOL/L (ref 22–30)
EOSINOPHIL # BLD AUTO: 0.1 K/UL (ref 0–0.7)
EOSINOPHIL NFR BLD AUTO: 1 %
ERYTHROCYTE [DISTWIDTH] IN BLOOD BY AUTOMATED COUNT: 4.89 M/UL (ref 3.8–5.4)
ERYTHROCYTE [DISTWIDTH] IN BLOOD: 13.7 % (ref 11.5–15.5)
HCT VFR BLD AUTO: 44.5 % (ref 34–46)
HGB BLD-MCNC: 14.8 GM/DL (ref 11.4–16)
LYMPHOCYTES # SPEC AUTO: 1.4 K/UL (ref 1–4.8)
LYMPHOCYTES NFR SPEC AUTO: 20 %
MCH RBC QN AUTO: 30.2 PG (ref 25–35)
MCHC RBC AUTO-ENTMCNC: 33.2 G/DL (ref 31–37)
MCV RBC AUTO: 91 FL (ref 80–100)
MONOCYTES # BLD AUTO: 0.6 K/UL (ref 0–1)
MONOCYTES NFR BLD AUTO: 9 %
NEUTROPHILS # BLD AUTO: 4.7 K/UL (ref 1.3–7.7)
NEUTROPHILS NFR BLD AUTO: 67 %
PLATELET # BLD AUTO: 179 K/UL (ref 150–450)
POTASSIUM SERPL-SCNC: 4.5 MMOL/L (ref 3.5–5.1)
SODIUM SERPL-SCNC: 137 MMOL/L (ref 137–145)
WBC # BLD AUTO: 7 K/UL (ref 3.8–10.6)

## 2021-09-17 PROCEDURE — 93005 ELECTROCARDIOGRAM TRACING: CPT

## 2021-09-17 PROCEDURE — 87186 SC STD MICRODIL/AGAR DIL: CPT

## 2021-09-17 PROCEDURE — 87086 URINE CULTURE/COLONY COUNT: CPT

## 2021-09-17 PROCEDURE — 84520 ASSAY OF UREA NITROGEN: CPT

## 2021-09-17 PROCEDURE — 80051 ELECTROLYTE PANEL: CPT

## 2021-09-17 PROCEDURE — 82565 ASSAY OF CREATININE: CPT

## 2021-09-17 PROCEDURE — 36415 COLL VENOUS BLD VENIPUNCTURE: CPT

## 2021-09-17 PROCEDURE — 85025 COMPLETE CBC W/AUTO DIFF WBC: CPT

## 2021-09-17 PROCEDURE — 87077 CULTURE AEROBIC IDENTIFY: CPT

## 2021-09-27 ENCOUNTER — HOSPITAL ENCOUNTER (OUTPATIENT)
Dept: HOSPITAL 47 - OR | Age: 76
LOS: 1 days | Discharge: HOME | End: 2021-09-28
Attending: OBSTETRICS & GYNECOLOGY
Payer: MEDICARE

## 2021-09-27 DIAGNOSIS — Z80.3: ICD-10-CM

## 2021-09-27 DIAGNOSIS — Z95.0: ICD-10-CM

## 2021-09-27 DIAGNOSIS — I10: ICD-10-CM

## 2021-09-27 DIAGNOSIS — Z79.899: ICD-10-CM

## 2021-09-27 DIAGNOSIS — N72: ICD-10-CM

## 2021-09-27 DIAGNOSIS — D25.1: ICD-10-CM

## 2021-09-27 DIAGNOSIS — Z88.1: ICD-10-CM

## 2021-09-27 DIAGNOSIS — N83.201: ICD-10-CM

## 2021-09-27 DIAGNOSIS — N88.8: ICD-10-CM

## 2021-09-27 DIAGNOSIS — Z80.1: ICD-10-CM

## 2021-09-27 DIAGNOSIS — N81.4: Primary | ICD-10-CM

## 2021-09-27 DIAGNOSIS — Z79.82: ICD-10-CM

## 2021-09-27 PROCEDURE — 86901 BLOOD TYPING SEROLOGIC RH(D): CPT

## 2021-09-27 PROCEDURE — 58260 VAGINAL HYSTERECTOMY: CPT

## 2021-09-27 PROCEDURE — 86900 BLOOD TYPING SEROLOGIC ABO: CPT

## 2021-09-27 PROCEDURE — 88305 TISSUE EXAM BY PATHOLOGIST: CPT

## 2021-09-27 PROCEDURE — 86850 RBC ANTIBODY SCREEN: CPT

## 2021-09-27 PROCEDURE — 87635 SARS-COV-2 COVID-19 AMP PRB: CPT

## 2021-09-27 RX ADMIN — POTASSIUM CHLORIDE SCH: 14.9 INJECTION, SOLUTION INTRAVENOUS at 16:38

## 2021-09-27 RX ADMIN — POTASSIUM CHLORIDE SCH MLS/HR: 14.9 INJECTION, SOLUTION INTRAVENOUS at 17:51

## 2021-09-27 RX ADMIN — POTASSIUM CHLORIDE SCH MLS: 14.9 INJECTION, SOLUTION INTRAVENOUS at 09:52

## 2021-09-27 NOTE — P.OP
Date of Procedure: 09/27/21


Preoperative Diagnosis: 


Grade 4 uterine prolapse, small right ovarian cyst with negative ARJUN testing


Postoperative Diagnosis: 


Same, right ovary extremely high on the right pelvic sidewall, visualized but 

unable to be excised, normal in appearance.


Procedure(s) Performed: 


Vaginal hysterectomy


Anesthesia: SARAI


Surgeon: Nancy Reyes


Assistant #1: Ya Quiroga


Estimated Blood Loss (ml): 75


IV fluids (ml): 800


Urine output (ml): 300


Pathology: other (Cervix and uterus)


Condition: stable


Disposition: PACU


Operative Findings: 


Bilateral ovaries normal to inspection.  Ovaries quite high along the pelvic s

idewalls.


Description of Procedure: 


Patient is brought to the operating suite where a general anesthetic is 

administered after spinal with Duramorph is placed.  She's placed in the dorsal 

lithotomy position, stockings placed, antibiotics given.  The cervix vagina and 

perineal bodies are all prepped and draped in usual sterile fashion.  The 

appropriate timeout was performed to assure proper patient and procedural 

identification.





The bladder is drained for approximate 300 mL of clear yellow urine.  Weighted 

speculum was placed into the vagina.  Anterior lip of the cervix is grasped with

a double-tooth tenaculum.  Scalpel is used to incise the mucosa 

circumferentially with a V positioning at 6:00.  Sponge rolled finger is used to

sweep the mucosa from the underlying plane.  Metzenbaum scissors are used at 

6:00 to open the perineal body, this is suture tied with 2-0 Vicryl.  This is 

held with a hemostat.  Large billed speculum was then placed into the peritoneal

cavity.  At all times the mucosa is swept well from the operative field to avoid

bladder and/or ureteral injury.





The right uterosacral ligament is identified, clamped cut and suture ligated.  

This stitch is held with a hemostat laterally.  Same procedure is carried out 

contralaterally.  Uterine vasculature is skeletonized, clamped cut and suture 

ligated.  Please note that 0 Vicryl is used for these entire hysterectomy.  2 

additional pedicles are taken superior to the vessels.  The anterior peritoneum 

was then entered at 12:00.  Uterus is "walked out" posteriorly.  Logan clamps 

are used across the final pedicles, the cervix and uterus are removed and sent 

to pathology.  These pedicles are suture tied with 0 Vicryl suture, flashed, and

retied for excellent hemostasis.





At this time the ovaries are inspected.  There are very high along the bilateral

pelvic sidewalls.  A Jayy clamp is used in an attempt to bring the right 

ovary into the surgical field, however even with long instruments the ovary 

cannot be reached.  Because it appears healthy and normal to inspection, and is 

palpated normally, it is left in situ.  The 2-0 Vicryl suture at 6:00 is brought

around in a pursestring fashion to close the peritoneum.  The uterosacral 

ligaments are brought across to incorporate the opposite ligament as well as 

vaginal mucosa.  2 additional figure-of-eight sutures are used on the vaginal 

mucosa for final closure.





James is noted to be draining clear urine.  Vagina is packed with one-inch 

iodophor gauze with basic tracing.  All sponge needle and enhancement counts are

correct.  Patient is brought back to recovery room in excellent condition with a

blood pressure 100/47, pulse 60.

## 2021-09-28 VITALS
DIASTOLIC BLOOD PRESSURE: 68 MMHG | RESPIRATION RATE: 14 BRPM | HEART RATE: 67 BPM | SYSTOLIC BLOOD PRESSURE: 129 MMHG | TEMPERATURE: 97.7 F

## 2021-09-28 RX ADMIN — POTASSIUM CHLORIDE SCH: 14.9 INJECTION, SOLUTION INTRAVENOUS at 05:51

## 2021-09-28 RX ADMIN — POTASSIUM CHLORIDE SCH MLS/HR: 14.9 INJECTION, SOLUTION INTRAVENOUS at 01:59

## 2021-09-28 NOTE — P.DS
Providers


Date of admission: 





9/27/21


Expected date of discharge: 09/28/21


Attending physician: 


Nancy Reyes





Primary care physician: 


[facility] Course: 





This is a 76-year-old female who presented with a grade 4 uterine prolapse and b

ilateral groin pain.  In addition, sonographically, a small right ovarian cyst 

was noted, likely consistent with fibroma.  Marcie testing was done and negative. 

The plan was to remove the ovary if it was obtainable through the vaginal vault.

 Please see my dictated history and physical for details.





Patient underwent vaginal hysterectomy yesterday with a general anesthetic and 

spinal with Duramorph.  Unfortunately the right ovary was very high on the right

lateral pelvic sidewall and unable to be retrieved vaginally.  By palpation and 

inspection it appeared very normal to my assessment and was left in situ.  

Vaginal packing and James catheter replaced.  Please see my dictated operative 

note for details.





This morning the patient is doing well.  Vital signs are stable and she is 

afebrile.  Blood pressure 120/60.  Abdomen is soft and nontender.  No CVA 

tenderness.  Extremities are negative.  Chest is clear.  Active bowel sounds.  

Patient is judged to be in very good condition for discharge home.  She is 

reminded no intercourse, tampons or douching.  No heavy lifting.  She will use 

Advil, Motrin or Aleve as needed for pain.  She will call with any vaginal 

bleeding, any pain not alleviated by over-the-counter products, or any concerns.

 She'll follow-up with me in the office in 2 weeks.














Assessment: 


Doing well postoperative day #1


Patient Condition at Discharge: Good





Plan - Discharge Summary


Discharge Rx Participant: No


New Discharge Prescriptions: 


No Action


   Multivitamins, Thera [Multivitamin (formulary)] 1 tab PO DAILY


   Aspirin EC [Ecotrin Low Dose] 81 mg PO DAILY


   Fexofenadine HCl [Allegra Allergy] 1 tab PO DAILY


   Psyllium Husk/Aspartame [Metamucil Sugar-Free Powder] 1 dose PO DAILY


   Turmeric Root Extract [Turmeric] 1 cap PO DAILY


   Artichoke 350 mg PO BID


   Acetaminophen [Tylenol Extra Strength] 100 mg PO BID PRN


     PRN Reason: Pain


   Cholecalciferol [Vitamin D3 (25 Mcg = 1000 Iu)] 50 mcg PO DAILY


   Losartan-Hctz 50-12.5 mg [Hyzaar 50-12.5] 1 tab PO QAM


   Calcium Carbonate [Tums] 1 - 2 tab PO AS DIRECTED PRN


     PRN Reason: GERD


Discharge Medication List





Aspirin EC [Ecotrin Low Dose] 81 mg PO DAILY 09/12/17 [History]


Multivitamins, Thera [Multivitamin (formulary)] 1 tab PO DAILY 09/12/17 

[History]


Acetaminophen [Tylenol Extra Strength] 100 mg PO BID PRN 09/23/21 [History]


Artichoke 350 mg PO BID 09/23/21 [History]


Calcium Carbonate [Tums] 1 - 2 tab PO AS DIRECTED PRN 09/23/21 [History]


Cholecalciferol [Vitamin D3 (25 Mcg = 1000 Iu)] 50 mcg PO DAILY 09/23/21 

[History]


Fexofenadine HCl [Allegra Allergy] 1 tab PO DAILY 09/23/21 [History]


Losartan-Hctz 50-12.5 mg [Hyzaar 50-12.5] 1 tab PO QAM 09/23/21 [History]


Psyllium Husk/Aspartame [Metamucil Sugar-Free Powder] 1 dose PO DAILY 09/23/21 

[History]


Turmeric Root Extract [Turmeric] 1 cap PO DAILY 09/23/21 [History]








Follow up Appointment(s)/Referral(s): 


Nancy Reyes MD [STAFF PHYSICIAN] - 2 Weeks


Discharge Disposition: HOME SELF-CARE